# Patient Record
Sex: FEMALE | Race: BLACK OR AFRICAN AMERICAN | NOT HISPANIC OR LATINO | URBAN - METROPOLITAN AREA
[De-identification: names, ages, dates, MRNs, and addresses within clinical notes are randomized per-mention and may not be internally consistent; named-entity substitution may affect disease eponyms.]

---

## 2017-09-22 ENCOUNTER — EMERGENCY (EMERGENCY)
Facility: HOSPITAL | Age: 32
LOS: 1 days | Discharge: HOME | End: 2017-09-22
Attending: EMERGENCY MEDICINE | Admitting: EMERGENCY MEDICINE
Payer: MEDICAID

## 2017-09-22 VITALS
DIASTOLIC BLOOD PRESSURE: 79 MMHG | RESPIRATION RATE: 16 BRPM | OXYGEN SATURATION: 98 % | TEMPERATURE: 98 F | HEART RATE: 75 BPM | SYSTOLIC BLOOD PRESSURE: 141 MMHG

## 2017-09-22 VITALS — WEIGHT: 164.91 LBS

## 2017-09-22 DIAGNOSIS — M25.562 PAIN IN LEFT KNEE: ICD-10-CM

## 2017-09-22 DIAGNOSIS — F17.210 NICOTINE DEPENDENCE, CIGARETTES, UNCOMPLICATED: ICD-10-CM

## 2017-09-22 DIAGNOSIS — M25.561 PAIN IN RIGHT KNEE: ICD-10-CM

## 2017-09-22 DIAGNOSIS — Z79.1 LONG TERM (CURRENT) USE OF NON-STEROIDAL ANTI-INFLAMMATORIES (NSAID): ICD-10-CM

## 2017-09-22 DIAGNOSIS — Z79.899 OTHER LONG TERM (CURRENT) DRUG THERAPY: ICD-10-CM

## 2017-09-22 DIAGNOSIS — Z90.49 ACQUIRED ABSENCE OF OTHER SPECIFIED PARTS OF DIGESTIVE TRACT: Chronic | ICD-10-CM

## 2017-09-22 DIAGNOSIS — G89.29 OTHER CHRONIC PAIN: ICD-10-CM

## 2017-09-22 DIAGNOSIS — Z91.013 ALLERGY TO SEAFOOD: ICD-10-CM

## 2017-09-22 LAB — HCG UR QL: NEGATIVE — SIGNIFICANT CHANGE UP

## 2017-09-22 PROCEDURE — 99284 EMERGENCY DEPT VISIT MOD MDM: CPT

## 2017-09-22 RX ORDER — HYDROCHLOROTHIAZIDE 25 MG
0 TABLET ORAL
Qty: 0 | Refills: 0 | COMMUNITY

## 2017-09-22 RX ORDER — KETOROLAC TROMETHAMINE 30 MG/ML
60 SYRINGE (ML) INJECTION ONCE
Qty: 0 | Refills: 0 | Status: DISCONTINUED | OUTPATIENT
Start: 2017-09-22 | End: 2017-09-22

## 2017-09-22 RX ORDER — IBUPROFEN 200 MG
1 TABLET ORAL
Qty: 30 | Refills: 0 | OUTPATIENT
Start: 2017-09-22

## 2017-09-22 RX ADMIN — Medication 60 MILLIGRAM(S): at 17:11

## 2017-09-22 NOTE — ED BEHAVIORAL HEALTH NOTE - BEHAVIORAL HEALTH NOTE
Sw was consulted to the ED to assist the patient. Patient is a 32 year old female, domiclied,  and employed. Patient is having assistance locating a facility that will be able to provide her with an MRI. Unfortunately, patients insurance is not accepted at the imaging location that we have in the building. This worker was able to provide a list of facilities that do MRI's and taker her insurance.  Patient was very grateful for the list and stated that she will be calling some locations to see when she could get in at a sooner rather that later rate. Worker made available for any further assistance needed

## 2017-09-22 NOTE — ED PROVIDER NOTE - OBJECTIVE STATEMENT
33 yo F w/ history of chronic bilateral knee pain, HTN, bronchitis, arthritis and appendectomy complains of bilateral knee pain, left worse than right. 31 yo F w/ history of chronic bilateral knee pain, HTN, bronchitis, arthritis and appendectomy complains of bilateral knee pain, left worse than right with worsening pain with walking and clicking noise. Pt was seen in the ED 1 week ago for same symptoms; as per pt, xray shows evidence of DJD; prior xray show arthritis. Pt with MRI last year with abnormal results; pt was then referred to orthopedics, but did not go.

## 2017-09-22 NOTE — ED PROVIDER NOTE - MUSCULOSKELETAL, MLM
Spine appears normal, range of motion is not limited, no muscle or joint tenderness. ambulatory with slight antalgic gait. Spine appears normal, range of motion is not limited, no muscle or joint tenderness. ambulatory with slight antalgic gait. LLE: knee mild tenderness to bilateral patella, no effusion, no ecchymosis, full ROM with discomfort on full flexion, no calf or thigh tenderness, full ROM hip. RLE: full ROM to knee, no effusion, full ROM hip, no calf or thigh tenderness

## 2017-09-22 NOTE — ED PROVIDER NOTE - MEDICAL DECISION MAKING DETAILS
Chief complaint: bilateral knee pain, chronic  DDx: undiagnosed connective tissue disorder such as lupus or RA, possible prior ligamentous injury, DJD although young for DJD  Plan: upreg, toradol IM, will discharge with motrin and percocet for breakthrough pain. pt has PMD and ortho arraigning MRI; SW working on possible options of outpt imaging clinics; will offer cane

## 2017-09-22 NOTE — ED PROVIDER NOTE - PROGRESS NOTE DETAILS
pt stable for discharge home; do not feel need for xray as in the process of getting MRI. SW gave pt a list of imaging centers that take her insurance. will discharge on motrin and percocet for breakthrough pain, suggested pt order a sleeve for breakthrough pain. all follow up arranged by PMD. pt also recommended to follow up with rheumatology to rule out connective tissue disease